# Patient Record
Sex: FEMALE | Race: WHITE | Employment: FULL TIME | URBAN - METROPOLITAN AREA
[De-identification: names, ages, dates, MRNs, and addresses within clinical notes are randomized per-mention and may not be internally consistent; named-entity substitution may affect disease eponyms.]

---

## 2018-09-09 ENCOUNTER — HOSPITAL ENCOUNTER (EMERGENCY)
Age: 73
Discharge: HOME | End: 2018-09-09
Attending: EMERGENCY MEDICINE
Payer: COMMERCIAL

## 2018-09-09 VITALS
RESPIRATION RATE: 14 BRPM | BODY MASS INDEX: 33.96 KG/M2 | SYSTOLIC BLOOD PRESSURE: 181 MMHG | HEIGHT: 69 IN | DIASTOLIC BLOOD PRESSURE: 72 MMHG | TEMPERATURE: 97.9 F | HEART RATE: 80 BPM | WEIGHT: 229.3 LBS | OXYGEN SATURATION: 92 %

## 2018-09-09 DIAGNOSIS — B02.9 HERPES ZOSTER WITHOUT COMPLICATION: Primary | ICD-10-CM

## 2018-09-09 PROCEDURE — 99282 EMERGENCY DEPT VISIT SF MDM: CPT | Performed by: EMERGENCY MEDICINE

## 2018-09-09 RX ORDER — SPIRONOLACTONE 50 MG/1
TABLET, FILM COATED ORAL DAILY
COMMUNITY

## 2018-09-09 RX ORDER — CELECOXIB 200 MG/1
CAPSULE ORAL 2 TIMES DAILY
COMMUNITY
End: 2022-04-01

## 2018-09-09 RX ORDER — METOPROLOL SUCCINATE 25 MG/1
TABLET, EXTENDED RELEASE ORAL DAILY
COMMUNITY

## 2018-09-09 RX ORDER — PHENOL/SODIUM PHENOLATE
20 AEROSOL, SPRAY (ML) MUCOUS MEMBRANE DAILY
COMMUNITY

## 2018-09-09 RX ORDER — HYDROCODONE BITARTRATE AND ACETAMINOPHEN 5; 325 MG/1; MG/1
1 TABLET ORAL
Qty: 12 TAB | Refills: 0 | Status: SHIPPED | OUTPATIENT
Start: 2018-09-09 | End: 2019-04-09

## 2018-09-09 RX ORDER — PREDNISONE 20 MG/1
60 TABLET ORAL DAILY
Qty: 15 TAB | Refills: 0 | Status: SHIPPED | OUTPATIENT
Start: 2018-09-09 | End: 2018-09-14

## 2018-09-09 RX ORDER — VALACYCLOVIR HYDROCHLORIDE 1 G/1
1000 TABLET, FILM COATED ORAL 3 TIMES DAILY
Qty: 21 TAB | Refills: 0 | Status: SHIPPED | OUTPATIENT
Start: 2018-09-09 | End: 2018-09-16

## 2018-09-09 NOTE — DISCHARGE INSTRUCTIONS

## 2018-09-09 NOTE — ED NOTES
I have reviewed discharge instructions with the patient. The patient verbalized understanding. Patient left ED via Discharge Method: ambulatory to Home with daughter. Opportunity for questions and clarification provided. Patient given 3 scripts. To continue your aftercare when you leave the hospital, you may receive an automated call from our care team to check in on how you are doing. This is a free service and part of our promise to provide the best care and service to meet your aftercare needs.  If you have questions, or wish to unsubscribe from this service please call [phone]. Thank you for Choosing our [facility] Emergency Department.

## 2018-09-09 NOTE — ED PROVIDER NOTES
HPI: 
68 F, here with painful rash that started on the right chest wall that started 6 days ago. Initially itchy then rash and painful. Rt posterior chest wall radiating to Rt anterior. No chest pain or shortness of breath. No fever. No facial lesions. ROS Constitutional: No fever, no chills Skin: no rash Eye: No vision changes ENMT: No sore throat Respiratory: No shortness of breath, no cough Cardiovascular: No chest pain, no palpitations Gastrointestinal: No vomiting, no nausea, no diarrhea, no abdominal pain : No dysuria MSK: No back pain, no muscle pain Neuro: No headache, no change in mental status, no numbness, no tingling, no weakness Psych:  
Endocrine:  
All other review of systems positive per history of present illness and the above otherwise negative or noncontributory. Visit Vitals  /72  Pulse 80  Temp 97.9 °F (36.6 °C)  Resp 14  
 Ht 5' 9\" (1.753 m)  Wt 104 kg (229 lb 4.8 oz)  SpO2 92%  BMI 33.86 kg/m2 Past Medical History:  
Diagnosis Date  Hypertension No past surgical history on file. Prior to Admission Medications Prescriptions Last Dose Informant Patient Reported? Taking? Omeprazole delayed release (PRILOSEC D/R) 20 mg tablet   Yes Yes Sig: Take 20 mg by mouth daily. celecoxib (CELEBREX) 200 mg capsule   Yes Yes Sig: Take  by mouth two (2) times a day. metoprolol succinate (TOPROL-XL) 25 mg XL tablet   Yes Yes Sig: Take  by mouth daily. spironolactone (ALDACTONE) 50 mg tablet   Yes Yes Sig: Take  by mouth daily. Facility-Administered Medications: None Adult Exam  
General: alert, no acute distress Head: normocephalic, atraumatic ENT: moist mucous membranes. No conjunctivitis. No oral lesions Neck: supple, non-tender; full range of motion Cardiovascular: regular rate and rhythm, normal peripheral perfusion, no edema Respiratory:  normal respirations; no wheezing, rales or rhonchi Gastrointestinal: soft, non-tender; no rebound or guarding, no peritoneal signs, no distension Back: non-tender, full range of motion Musculoskeletal: normal range of motion, normal strength, no gross deformities Right posterior chest wall wrapping around to the right anterior chest wall including the breasts with area of palpable, small blisterlike lesion with erythema without any obvious discharge or cellulitis. Neurological: alert and oriented x 4, no gross focal deficits; normal speech Psychiatric: cooperative; appropriate mood and affect MDM: appear consistent with herpes zoster. We'll place the patient on steroid, Valtrex, pain medication and hydrocodone. Recommend follow-up with primary care physician for checkup. Return precautions given. Do not suspect systemic involvement since there are no involvement of the left. ED Course No results found. No results found for this or any previous visit (from the past 24 hour(s)). Dragon voice recognition software was used to create this note. Although the note has been reviewed and corrected where necessary, additional errors may have been overlooked and remain in the text.

## 2018-09-09 NOTE — LETTER
[facility] EMERGENCY DEPT 
[address] [place] [id number] 
[phone] Work/School Note Date: 9/9/2018 To Whom It May concern: 
 
Michelle Johnson was seen and treated today in the emergency room by the following provider(s): 
Attending Provider: Liu Dewitt MD.   
 
Michelle Johnson {Return to school/sport/work: 9/12/18 Sincerely, Liu Dewitt MD

## 2019-06-07 ENCOUNTER — HOSPITAL ENCOUNTER (OUTPATIENT)
Dept: SURGERY | Age: 74
Discharge: HOME | End: 2019-06-07

## 2019-06-11 VITALS — HEIGHT: 69 IN | BODY MASS INDEX: 34.96 KG/M2 | WEIGHT: 236 LBS

## 2019-06-11 PROBLEM — E66.01 SEVERE OBESITY (HCC): Status: ACTIVE | Noted: 2019-06-11

## 2019-06-11 NOTE — PERIOP NOTES
Patient verified name and . Order for consent NOT found in EHR - unable to verify procedure at this time. Type 1b surgery, Phone assessment complete. Orders not received. Labs per surgeon: none  Labs per anesthesia protocol: K+       Patient answered medical/surgical history questions at their best of ability. All prior to admission medications documented in [facility]. Patient instructed to take the following medications the day of surgery according to anesthesia guidelines with a small sip of water: metoprolol, prilosec . Hold all vitamins 7 days prior to surgery and NSAIDS 5 days prior to surgery. Prescription meds to hold: - none    Patient instructed on the following:  Arrive at [facility], time of arrival to be called the day before by 1700  NPO after midnight including gum, mints, and ice chips  Responsible adult must drive patient to the hospital, stay during surgery, and patient will need supervision 24 hours after anesthesia  Use antibacterial soap in shower the night before surgery and on the morning of surgery  All piercings must be removed prior to arrival.    Leave all valuables (money and jewelry) at home but bring insurance card and ID on       DOS. Do not wear make-up, nail polish, lotions, cologne, perfumes, powders, or oil on skin. Patient teach back successful and patient demonstrates knowledge of instruction.

## 2019-06-13 ENCOUNTER — ANESTHESIA EVENT (OUTPATIENT)
Dept: SURGERY | Age: 74
End: 2019-06-13
Payer: COMMERCIAL

## 2019-06-13 NOTE — H&P
Subjective:     Jacki Dey, MRN: [id number], is a 69 y.o.  female presents with PMB and thickened endometrium on U/S. gradually worsening course. See office notes on care. Patient Active Problem List    Diagnosis Date Noted    Severe obesity ([address] 06/11/2019     Past Medical History:   Diagnosis Date    Hypertension       Past Surgical History:   Procedure Laterality Date    HX TONSILLECTOMY        [unfilled]  No Known Allergies   Social History     Tobacco Use    Smoking status: Never Smoker    Smokeless tobacco: Never Used   Substance Use Topics    Alcohol use: No      Family History   Problem Relation Age of Onset    Lung Disease Mother     Lung Disease Father         Prenatal Labs: No results found for: RUBELLAEXT, GRBSEXT, HBSAGEXT, HIVEXT, RPREXT, GONNOEXT, CHLAMEXT     Review of Systems  Constitutional: negative  Respiratory: negative  Cardiovascular: negative  Musculoskeletal:negative    Objective:     No data found. No intake or output data in the 24 hours ending 06/13/19 0925  There were no vitals taken for this visit. General appearance: alert, cooperative, no distress, appears stated age  Head: Normocephalic, without obvious abnormality, atraumatic  Back: symmetric, no curvature. ROM normal. No CVA tenderness. Lungs: clear to auscultation bilaterally  Heart: regular rate and rhythm, S1, S2 normal, no murmur, click, rub or gallop  Abdomen: soft, non-tender. Bowel sounds normal. No masses,  no organomegaly  Pelvic: External genitalia normal, Vagina normal without discharge, Urethra without abnormality or discharge, no bladder tenderness, cervix normal in appearance, uterus normal size, shape, and consistency, cervix  Extremities: extremities normal, atraumatic, no cyanosis or edema  Pulses: 2+ and symmetric  Skin: Skin color, texture, turgor normal. No rashes or lesions      Assessment:     Active Problems:    * No active hospital problems. *      PMB for D and C, hysteroscopy. Discussed risks of infection, DVT, damage to bowel/bladder/other internal organs, bleeding/transfusion, scar tissue/adhesions. All questions answered, will proceed.       Plan:     D and C with hysteroscopy under general anesthesia

## 2019-06-14 ENCOUNTER — HOSPITAL ENCOUNTER (OUTPATIENT)
Age: 74
Discharge: HOME | End: 2019-06-14
Attending: OBSTETRICS & GYNECOLOGY | Admitting: OBSTETRICS & GYNECOLOGY
Payer: COMMERCIAL

## 2019-06-14 ENCOUNTER — ANESTHESIA (OUTPATIENT)
Dept: SURGERY | Age: 74
End: 2019-06-14
Payer: COMMERCIAL

## 2019-06-14 VITALS
DIASTOLIC BLOOD PRESSURE: 65 MMHG | SYSTOLIC BLOOD PRESSURE: 148 MMHG | BODY MASS INDEX: 34.67 KG/M2 | RESPIRATION RATE: 16 BRPM | OXYGEN SATURATION: 94 % | HEART RATE: 63 BPM | WEIGHT: 234.06 LBS | HEIGHT: 69 IN | TEMPERATURE: 96.8 F

## 2019-06-14 DIAGNOSIS — N95.0 PMB (POSTMENOPAUSAL BLEEDING): Primary | ICD-10-CM

## 2019-06-14 LAB — POTASSIUM BLD-SCNC: 4.1 MMOL/L (ref 3.5–5.1)

## 2019-06-14 PROCEDURE — 77030018836 HC SOL IRR NACL ICUM -A: Performed by: OBSTETRICS & GYNECOLOGY

## 2019-06-14 PROCEDURE — 77030010509 HC AIRWY LMA MSK TELE -A: Performed by: ANESTHESIOLOGY

## 2019-06-14 PROCEDURE — 77030019940 HC BLNKT HYPOTHRM STRY -B: Performed by: ANESTHESIOLOGY

## 2019-06-14 PROCEDURE — 76210000016 HC OR PH I REC 1 TO 1.5 HR: Performed by: OBSTETRICS & GYNECOLOGY

## 2019-06-14 PROCEDURE — 74011000250 HC RX REV CODE- 250

## 2019-06-14 PROCEDURE — 88305 TISSUE EXAM BY PATHOLOGIST: CPT

## 2019-06-14 PROCEDURE — 74011250636 HC RX REV CODE- 250/636: Performed by: OBSTETRICS & GYNECOLOGY

## 2019-06-14 PROCEDURE — 74011250636 HC RX REV CODE- 250/636

## 2019-06-14 PROCEDURE — 76210000020 HC REC RM PH II FIRST 0.5 HR: Performed by: OBSTETRICS & GYNECOLOGY

## 2019-06-14 PROCEDURE — 76010000138 HC OR TIME 0.5 TO 1 HR: Performed by: OBSTETRICS & GYNECOLOGY

## 2019-06-14 PROCEDURE — 74011250636 HC RX REV CODE- 250/636: Performed by: ANESTHESIOLOGY

## 2019-06-14 PROCEDURE — 84132 ASSAY OF SERUM POTASSIUM: CPT

## 2019-06-14 PROCEDURE — 76060000032 HC ANESTHESIA 0.5 TO 1 HR: Performed by: OBSTETRICS & GYNECOLOGY

## 2019-06-14 RX ORDER — MIDAZOLAM HYDROCHLORIDE 1 MG/ML
2 INJECTION, SOLUTION INTRAMUSCULAR; INTRAVENOUS
Status: COMPLETED | OUTPATIENT
Start: 2019-06-14 | End: 2019-06-14

## 2019-06-14 RX ORDER — CEFAZOLIN SODIUM/WATER 2 G/20 ML
2 SYRINGE (ML) INTRAVENOUS ONCE
Status: COMPLETED | OUTPATIENT
Start: 2019-06-14 | End: 2019-06-14

## 2019-06-14 RX ORDER — PROPOFOL 10 MG/ML
INJECTION, EMULSION INTRAVENOUS AS NEEDED
Status: DISCONTINUED | OUTPATIENT
Start: 2019-06-14 | End: 2019-06-14 | Stop reason: HOSPADM

## 2019-06-14 RX ORDER — OXYCODONE AND ACETAMINOPHEN 5; 325 MG/1; MG/1
1 TABLET ORAL
Qty: 10 TAB | Refills: 0 | Status: SHIPPED | OUTPATIENT
Start: 2019-06-14 | End: 2019-06-17

## 2019-06-14 RX ORDER — LIDOCAINE HYDROCHLORIDE 10 MG/ML
0.1 INJECTION INFILTRATION; PERINEURAL AS NEEDED
Status: DISCONTINUED | OUTPATIENT
Start: 2019-06-14 | End: 2019-06-14 | Stop reason: HOSPADM

## 2019-06-14 RX ORDER — EPHEDRINE SULFATE 50 MG/ML
INJECTION, SOLUTION INTRAVENOUS AS NEEDED
Status: DISCONTINUED | OUTPATIENT
Start: 2019-06-14 | End: 2019-06-14 | Stop reason: HOSPADM

## 2019-06-14 RX ORDER — ONDANSETRON 2 MG/ML
4 INJECTION INTRAMUSCULAR; INTRAVENOUS
Status: DISCONTINUED | OUTPATIENT
Start: 2019-06-14 | End: 2019-06-14 | Stop reason: HOSPADM

## 2019-06-14 RX ORDER — ACETAMINOPHEN 500 MG
1000 TABLET ORAL ONCE
Status: DISCONTINUED | OUTPATIENT
Start: 2019-06-14 | End: 2019-06-14 | Stop reason: HOSPADM

## 2019-06-14 RX ORDER — ACETAMINOPHEN 500 MG
500 TABLET ORAL
COMMUNITY

## 2019-06-14 RX ORDER — SODIUM CHLORIDE 0.9 % (FLUSH) 0.9 %
5-40 SYRINGE (ML) INJECTION AS NEEDED
Status: DISCONTINUED | OUTPATIENT
Start: 2019-06-14 | End: 2019-06-14 | Stop reason: HOSPADM

## 2019-06-14 RX ORDER — DEXAMETHASONE SODIUM PHOSPHATE 4 MG/ML
INJECTION, SOLUTION INTRA-ARTICULAR; INTRALESIONAL; INTRAMUSCULAR; INTRAVENOUS; SOFT TISSUE AS NEEDED
Status: DISCONTINUED | OUTPATIENT
Start: 2019-06-14 | End: 2019-06-14 | Stop reason: HOSPADM

## 2019-06-14 RX ORDER — FENTANYL CITRATE 50 UG/ML
INJECTION, SOLUTION INTRAMUSCULAR; INTRAVENOUS AS NEEDED
Status: DISCONTINUED | OUTPATIENT
Start: 2019-06-14 | End: 2019-06-14 | Stop reason: HOSPADM

## 2019-06-14 RX ORDER — ALBUTEROL SULFATE 0.83 MG/ML
2.5 SOLUTION RESPIRATORY (INHALATION) AS NEEDED
Status: DISCONTINUED | OUTPATIENT
Start: 2019-06-14 | End: 2019-06-14 | Stop reason: HOSPADM

## 2019-06-14 RX ORDER — ONDANSETRON 2 MG/ML
INJECTION INTRAMUSCULAR; INTRAVENOUS AS NEEDED
Status: DISCONTINUED | OUTPATIENT
Start: 2019-06-14 | End: 2019-06-14 | Stop reason: HOSPADM

## 2019-06-14 RX ORDER — LIDOCAINE HYDROCHLORIDE 20 MG/ML
INJECTION, SOLUTION EPIDURAL; INFILTRATION; INTRACAUDAL; PERINEURAL AS NEEDED
Status: DISCONTINUED | OUTPATIENT
Start: 2019-06-14 | End: 2019-06-14 | Stop reason: HOSPADM

## 2019-06-14 RX ORDER — SODIUM CHLORIDE 0.9 % (FLUSH) 0.9 %
5-40 SYRINGE (ML) INJECTION EVERY 8 HOURS
Status: DISCONTINUED | OUTPATIENT
Start: 2019-06-14 | End: 2019-06-14 | Stop reason: HOSPADM

## 2019-06-14 RX ORDER — HYDROMORPHONE HYDROCHLORIDE 2 MG/ML
0.5 INJECTION, SOLUTION INTRAMUSCULAR; INTRAVENOUS; SUBCUTANEOUS
Status: DISCONTINUED | OUTPATIENT
Start: 2019-06-14 | End: 2019-06-14 | Stop reason: HOSPADM

## 2019-06-14 RX ORDER — SODIUM CHLORIDE, SODIUM LACTATE, POTASSIUM CHLORIDE, CALCIUM CHLORIDE 600; 310; 30; 20 MG/100ML; MG/100ML; MG/100ML; MG/100ML
1000 INJECTION, SOLUTION INTRAVENOUS CONTINUOUS
Status: DISCONTINUED | OUTPATIENT
Start: 2019-06-14 | End: 2019-06-14 | Stop reason: HOSPADM

## 2019-06-14 RX ORDER — KETOROLAC TROMETHAMINE 30 MG/ML
INJECTION, SOLUTION INTRAMUSCULAR; INTRAVENOUS AS NEEDED
Status: DISCONTINUED | OUTPATIENT
Start: 2019-06-14 | End: 2019-06-14 | Stop reason: HOSPADM

## 2019-06-14 RX ADMIN — LIDOCAINE HYDROCHLORIDE 0.1 ML: 10 INJECTION, SOLUTION INFILTRATION; PERINEURAL at 06:53

## 2019-06-14 RX ADMIN — LIDOCAINE HYDROCHLORIDE 100 MG: 20 INJECTION, SOLUTION EPIDURAL; INFILTRATION; INTRACAUDAL; PERINEURAL at 07:51

## 2019-06-14 RX ADMIN — KETOROLAC TROMETHAMINE 15 MG: 30 INJECTION, SOLUTION INTRAMUSCULAR; INTRAVENOUS at 08:08

## 2019-06-14 RX ADMIN — FENTANYL CITRATE 50 MCG: 50 INJECTION, SOLUTION INTRAMUSCULAR; INTRAVENOUS at 07:51

## 2019-06-14 RX ADMIN — MIDAZOLAM 2 MG: 1 INJECTION INTRAMUSCULAR; INTRAVENOUS at 07:40

## 2019-06-14 RX ADMIN — DEXAMETHASONE SODIUM PHOSPHATE 5 MG: 4 INJECTION, SOLUTION INTRA-ARTICULAR; INTRALESIONAL; INTRAMUSCULAR; INTRAVENOUS; SOFT TISSUE at 07:56

## 2019-06-14 RX ADMIN — Medication 2 G: at 07:57

## 2019-06-14 RX ADMIN — ONDANSETRON 4 MG: 2 INJECTION INTRAMUSCULAR; INTRAVENOUS at 07:56

## 2019-06-14 RX ADMIN — EPHEDRINE SULFATE 10 MG: 50 INJECTION, SOLUTION INTRAVENOUS at 07:54

## 2019-06-14 RX ADMIN — SODIUM CHLORIDE, SODIUM LACTATE, POTASSIUM CHLORIDE, AND CALCIUM CHLORIDE 1000 ML: 600; 310; 30; 20 INJECTION, SOLUTION INTRAVENOUS at 06:53

## 2019-06-14 RX ADMIN — PROPOFOL 150 MG: 10 INJECTION, EMULSION INTRAVENOUS at 07:51

## 2019-06-14 RX ADMIN — FENTANYL CITRATE 25 MCG: 50 INJECTION, SOLUTION INTRAMUSCULAR; INTRAVENOUS at 08:02

## 2019-06-14 RX ADMIN — FENTANYL CITRATE 25 MCG: 50 INJECTION, SOLUTION INTRAMUSCULAR; INTRAVENOUS at 08:07

## 2019-06-14 NOTE — ANESTHESIA PREPROCEDURE EVALUATION
Relevant Problems   No relevant active problems       Anesthetic History   No history of anesthetic complications            Review of Systems / Medical History  Patient summary reviewed and pertinent labs reviewed    Pulmonary  Within defined limits                 Neuro/Psych   Within defined limits           Cardiovascular    Hypertension              Exercise tolerance: >4 METS     GI/Hepatic/Renal  Within defined limits              Endo/Other        Morbid obesity     Other Findings              Physical Exam    Airway  Mallampati: II  TM Distance: 4 - 6 cm  Neck ROM: normal range of motion   Mouth opening: Normal     Cardiovascular    Rhythm: regular  Rate: normal      Pertinent negatives: No murmur   Dental  No notable dental hx       Pulmonary  Breath sounds clear to auscultation               Abdominal         Other Findings            Anesthetic Plan    ASA: 2  Anesthesia type: general          Induction: Intravenous  Anesthetic plan and risks discussed with: Patient      LMA

## 2019-06-14 NOTE — BRIEF OP NOTE
BRIEF OPERATIVE NOTE    Date of Procedure: 6/14/2019   Preoperative Diagnosis: PMB (postmenopausal bleeding) [N95.0]  Postoperative Diagnosis: * No post-op diagnosis entered *    Procedure(s):  DILATATION AND CURETTAGE HYSTEROSCOPY  Surgeon(s) and Role:     * Chanda Childs MD - Primary         Surgical Assistant: none    Surgical Staff:  Circ-1: Delmy Walters RN  Scrub Tech-1: Deandra Willson  Event Time In Time Out   Incision Start 0803    Incision Close       Anesthesia: General   Estimated Blood Loss: 5cc  Specimens:   ID Type Source Tests Collected by Time Destination   1 : Endometrial curettings Preservative Uterus  Chanda Childs MD 6/14/2019 0808 Pathology      Findings: wnl, no polyps/fibroids   Complications: none  Implants: * No implants in log *

## 2019-06-14 NOTE — ANESTHESIA POSTPROCEDURE EVALUATION
Procedure(s):  DILATATION AND CURETTAGE HYSTEROSCOPY.     general    Anesthesia Post Evaluation      Multimodal analgesia: multimodal analgesia used between 6 hours prior to anesthesia start to PACU discharge  Patient location during evaluation: bedside  Patient participation: complete - patient participated  Level of consciousness: awake and responsive to light touch  Pain management: adequate  Airway patency: patent  Anesthetic complications: no  Cardiovascular status: acceptable, hemodynamically stable, blood pressure returned to baseline and stable  Respiratory status: acceptable, unassisted, spontaneous ventilation and nonlabored ventilation  Hydration status: acceptable  Post anesthesia nausea and vomiting:  controlled      Vitals Value Taken Time   /67 6/14/2019  8:25 AM   Temp 36 °C (96.8 °F) 6/14/2019  8:24 AM   Pulse 83 6/14/2019  8:24 AM   Resp 14 6/14/2019  8:24 AM   SpO2 93 % 6/14/2019  8:24 AM

## 2019-06-15 NOTE — OP NOTES
[address] [address]  OPERATIVE REPORT    Name:  Haroon Bautista  MR#:  [id number]  :  1945  ACCOUNT #:  [de-identified]  DATE OF SERVICE:  2019    PREOPERATIVE DIAGNOSIS:  Postmenopausal bleeding. POSTOPERATIVE DIAGNOSIS:  Postmenopausal bleeding. PROCEDURE PERFORMED:  1. Dilatation and curettage. 2.  Hysteroscopy. SURGEON:  Faiza Hsieh MD    ASSISTANT:  none. ANESTHESIA:  General.    COMPLICATIONS:  None. SPECIMENS REMOVED:  Tissue, endometrial scrapings. IMPLANTS:  None. ESTIMATED BLOOD LOSS:  5 to 10 mL. PROCEDURE:  After informed consent, the patient was taken to the operating room and given general anesthesia. She was prepped and draped in the usual sterile fashion in the dorsal lithotomy position. Examination under anesthesia was carried out. At this time, a weighted speculum was placed in the vagina and tenaculum was used to grasp the anterior lip of the cervix. Cervix was then dilated to admit the hysteroscope. The uterus sounded to 9 cm. Hysteroscope showed that there were no abnormal findings, just more than average endometrial thickness. There were no polyps or fibroids, the cavity itself was normal.  Endometrial scrapings were now obtained in a 360-degree fashion. The tissue was submitted for pathology study. The hysteroscope was reintroduced and there was no perforation or excessive bleeding. At this time, all the instruments were removed, and the patient was awakened and taken to the recovery room in stable condition. Counts were correct x2. She was given pain pills and precautions on discharge.       Tj Silva MD      [id number]  D:  2019 22:56  T:  06/15/2019 6:04  JOB #:  [id number]

## 2022-03-19 PROBLEM — E66.01 SEVERE OBESITY (HCC): Status: ACTIVE | Noted: 2019-06-11

## 2022-03-19 PROBLEM — N95.0 PMB (POSTMENOPAUSAL BLEEDING): Status: ACTIVE | Noted: 2019-06-14

## 2022-04-01 ENCOUNTER — APPOINTMENT (OUTPATIENT)
Dept: CT IMAGING | Age: 77
End: 2022-04-01
Attending: EMERGENCY MEDICINE
Payer: COMMERCIAL

## 2022-04-01 ENCOUNTER — HOSPITAL ENCOUNTER (EMERGENCY)
Age: 77
Discharge: HOME | End: 2022-04-01
Attending: EMERGENCY MEDICINE
Payer: COMMERCIAL

## 2022-04-01 VITALS
DIASTOLIC BLOOD PRESSURE: 76 MMHG | HEART RATE: 95 BPM | HEIGHT: 69 IN | TEMPERATURE: 98.6 F | SYSTOLIC BLOOD PRESSURE: 165 MMHG | WEIGHT: 220 LBS | OXYGEN SATURATION: 95 % | RESPIRATION RATE: 16 BRPM | BODY MASS INDEX: 32.58 KG/M2

## 2022-04-01 DIAGNOSIS — K50.00 TERMINAL ILEITIS WITHOUT COMPLICATION (HCC): Primary | ICD-10-CM

## 2022-04-01 LAB
ALBUMIN SERPL-MCNC: 3.4 G/DL (ref 3.2–4.6)
ALBUMIN/GLOB SERPL: 0.8 {RATIO} (ref 1.2–3.5)
ALP SERPL-CCNC: 93 U/L (ref 50–136)
ALT SERPL-CCNC: 24 U/L (ref 12–65)
ANION GAP SERPL CALC-SCNC: 5 MMOL/L (ref 7–16)
AST SERPL-CCNC: 15 U/L (ref 15–37)
BASOPHILS # BLD: 0.1 K/UL (ref 0–0.2)
BASOPHILS NFR BLD: 1 % (ref 0–2)
BILIRUB SERPL-MCNC: 0.9 MG/DL (ref 0.2–1.1)
BUN SERPL-MCNC: 11 MG/DL (ref 8–23)
CALCIUM SERPL-MCNC: 9.5 MG/DL (ref 8.3–10.4)
CHLORIDE SERPL-SCNC: 104 MMOL/L (ref 98–107)
CO2 SERPL-SCNC: 29 MMOL/L (ref 21–32)
CREAT SERPL-MCNC: 0.91 MG/DL (ref 0.6–1)
DIFFERENTIAL METHOD BLD: ABNORMAL
EOSINOPHIL # BLD: 0.1 K/UL (ref 0–0.8)
EOSINOPHIL NFR BLD: 1 % (ref 0.5–7.8)
ERYTHROCYTE [DISTWIDTH] IN BLOOD BY AUTOMATED COUNT: 11.9 % (ref 11.9–14.6)
GLOBULIN SER CALC-MCNC: 4.2 G/DL (ref 2.3–3.5)
GLUCOSE SERPL-MCNC: 101 MG/DL (ref 65–100)
HCT VFR BLD AUTO: 44.5 % (ref 35.8–46.3)
HGB BLD-MCNC: 14.5 G/DL (ref 11.7–15.4)
IMM GRANULOCYTES # BLD AUTO: 0.1 K/UL (ref 0–0.5)
IMM GRANULOCYTES NFR BLD AUTO: 1 % (ref 0–5)
LIPASE SERPL-CCNC: 58 U/L (ref 73–393)
LYMPHOCYTES # BLD: 1.1 K/UL (ref 0.5–4.6)
LYMPHOCYTES NFR BLD: 10 % (ref 13–44)
MCH RBC QN AUTO: 31.3 PG (ref 26.1–32.9)
MCHC RBC AUTO-ENTMCNC: 32.6 G/DL (ref 31.4–35)
MCV RBC AUTO: 96.1 FL (ref 79.6–97.8)
MONOCYTES # BLD: 0.9 K/UL (ref 0.1–1.3)
MONOCYTES NFR BLD: 8 % (ref 4–12)
NEUTS SEG # BLD: 8.9 K/UL (ref 1.7–8.2)
NEUTS SEG NFR BLD: 81 % (ref 43–78)
NRBC # BLD: 0 K/UL (ref 0–0.2)
PLATELET # BLD AUTO: 192 K/UL (ref 150–450)
PMV BLD AUTO: 8.7 FL (ref 9.4–12.3)
POTASSIUM SERPL-SCNC: 3.9 MMOL/L (ref 3.5–5.1)
PROT SERPL-MCNC: 7.6 G/DL (ref 6.3–8.2)
RBC # BLD AUTO: 4.63 M/UL (ref 4.05–5.2)
SODIUM SERPL-SCNC: 138 MMOL/L (ref 136–145)
WBC # BLD AUTO: 11.1 K/UL (ref 4.3–11.1)

## 2022-04-01 PROCEDURE — 74177 CT ABD & PELVIS W/CONTRAST: CPT

## 2022-04-01 PROCEDURE — 99285 EMERGENCY DEPT VISIT HI MDM: CPT

## 2022-04-01 PROCEDURE — 80053 COMPREHEN METABOLIC PANEL: CPT

## 2022-04-01 PROCEDURE — 74011250636 HC RX REV CODE- 250/636: Performed by: EMERGENCY MEDICINE

## 2022-04-01 PROCEDURE — 81003 URINALYSIS AUTO W/O SCOPE: CPT

## 2022-04-01 PROCEDURE — 83690 ASSAY OF LIPASE: CPT

## 2022-04-01 PROCEDURE — 74011000258 HC RX REV CODE- 258: Performed by: EMERGENCY MEDICINE

## 2022-04-01 PROCEDURE — 96374 THER/PROPH/DIAG INJ IV PUSH: CPT

## 2022-04-01 PROCEDURE — 85025 COMPLETE CBC W/AUTO DIFF WBC: CPT

## 2022-04-01 PROCEDURE — 74011000636 HC RX REV CODE- 636: Performed by: EMERGENCY MEDICINE

## 2022-04-01 RX ORDER — CIPROFLOXACIN 500 MG/1
500 TABLET ORAL 2 TIMES DAILY
Qty: 20 TABLET | Refills: 0 | Status: SHIPPED | OUTPATIENT
Start: 2022-04-01 | End: 2022-04-11

## 2022-04-01 RX ORDER — ONDANSETRON 8 MG/1
8 TABLET, ORALLY DISINTEGRATING ORAL
Qty: 12 TABLET | Refills: 0 | Status: SHIPPED | OUTPATIENT
Start: 2022-04-01

## 2022-04-01 RX ORDER — KETOROLAC TROMETHAMINE 15 MG/ML
15 INJECTION, SOLUTION INTRAMUSCULAR; INTRAVENOUS
Status: COMPLETED | OUTPATIENT
Start: 2022-04-01 | End: 2022-04-01

## 2022-04-01 RX ORDER — SODIUM CHLORIDE 0.9 % (FLUSH) 0.9 %
5-10 SYRINGE (ML) INJECTION EVERY 8 HOURS
Status: DISCONTINUED | OUTPATIENT
Start: 2022-04-01 | End: 2022-04-01 | Stop reason: HOSPADM

## 2022-04-01 RX ORDER — METRONIDAZOLE 500 MG/1
500 TABLET ORAL 3 TIMES DAILY
Qty: 30 TABLET | Refills: 0 | Status: SHIPPED | OUTPATIENT
Start: 2022-04-01 | End: 2022-04-11

## 2022-04-01 RX ORDER — MORPHINE SULFATE 15 MG/1
7.5 TABLET ORAL
Qty: 9 TABLET | Refills: 0 | Status: SHIPPED | OUTPATIENT
Start: 2022-04-01 | End: 2022-04-04

## 2022-04-01 RX ORDER — SODIUM CHLORIDE 0.9 % (FLUSH) 0.9 %
5-10 SYRINGE (ML) INJECTION AS NEEDED
Status: DISCONTINUED | OUTPATIENT
Start: 2022-04-01 | End: 2022-04-01 | Stop reason: HOSPADM

## 2022-04-01 RX ORDER — SODIUM CHLORIDE 0.9 % (FLUSH) 0.9 %
10 SYRINGE (ML) INJECTION
Status: COMPLETED | OUTPATIENT
Start: 2022-04-01 | End: 2022-04-01

## 2022-04-01 RX ADMIN — Medication 10 ML: at 08:22

## 2022-04-01 RX ADMIN — IOPAMIDOL 100 ML: 755 INJECTION, SOLUTION INTRAVENOUS at 08:21

## 2022-04-01 RX ADMIN — SODIUM CHLORIDE 100 ML: 9 INJECTION, SOLUTION INTRAVENOUS at 08:22

## 2022-04-01 RX ADMIN — KETOROLAC TROMETHAMINE 15 MG: 15 INJECTION, SOLUTION INTRAMUSCULAR; INTRAVENOUS at 07:42

## 2022-04-01 NOTE — Clinical Note
[address] [address] EMERGENCY DEPT  [address] [address] [phone]    Work/School Note    Date: 4/1/2022    To Whom It May concern:    Mignon Aj was seen and treated today in the emergency room by the following provider(s):  Attending Provider: Brooklynn Marino MD.      Mignon Aj is excused from work/school on 04/01/22 and 04/02/22. She is medically clear to return to work/school on 4/3/2022.        Sincerely,          Rajwinder Agee RN

## 2022-04-01 NOTE — DISCHARGE INSTRUCTIONS
Continue your Tylenol. It may help to take your Tylenol 650 mg 4 times a day instead of 1300 mg twice a day during this period of abdominal pain. Ibuprofen 400 mg every 6 hours as needed for breakthrough pain. Morphine 1/2 tablet every 4-6 hours if needed for severe breakthrough pain. Clear liquid today and advance as tolerated. Zofran for nausea. Cipro and Flagyl as prescribed until complete starting this morning. Follow-up with [facility] when called with appointment time.   Call them late Monday morning if you have not heard from them regarding this appointment

## 2022-04-01 NOTE — Clinical Note
[address] [address] EMERGENCY DEPT  [address] [address] [phone]    Work/School Note    Date: 4/1/2022    To Whom It May concern:    Tal Gonzalez was seen and treated today in the emergency room by the following provider(s):  Attending Provider: Michelle Mai MD.      Tla Gonzalez is excused from work/school on 04/01/22 and 04/02/22. She is medically clear to return to work/school on 4/3/2022.        Sincerely,          Guy Son MD

## 2022-04-01 NOTE — ED NOTES
I have reviewed discharge instructions with the patient. The patient verbalized understanding. Patient left ED via Discharge Method: ambulatory to Home with self and family at bedside. Opportunity for questions and clarification provided. Patient given 4 scripts. To continue your aftercare when you leave the hospital, you may receive an automated call from our care team to check in on how you are doing. This is a free service and part of our promise to provide the best care and service to meet your aftercare needs.  If you have questions, or wish to unsubscribe from this service please call [phone]. Thank you for Choosing our [address] [place] [address] Emergency Department.

## 2022-04-01 NOTE — ED PROVIDER NOTES
Ricardo Ritchie is a 69 y.o. female seen on 4/1/2022 in the [facility] EMERGENCY DEPT in room ER01/01. No chief complaint on file. HPI: 21-year-old female with history of hypertension and acid reflux presents with right lower quadrant abdominal pain onset yesterday at 1 PM.  Symptoms have progressively worsened and are worse with movement and certain positions. Pain is sharp and without radiation or aggravating or alleviating factors other than those mentioned previously. She denies any associated symptoms of fever, nausea, vomiting, diarrhea, constipation or blood or dark-colored stool. She denies any urinary symptoms. No numbness tingling or weakness in the extremities. The pain started in the right lower quadrant has not moved and does not radiate    Historian: The patient  REVIEW OF SYSTEMS     Review of Systems   Constitutional: Negative for chills and fever. HENT: Negative for congestion. Respiratory: Negative for cough and shortness of breath. Cardiovascular: Negative for chest pain. Gastrointestinal: Positive for abdominal pain. Negative for diarrhea, nausea and vomiting. Endocrine: Negative for polydipsia and polyuria. Genitourinary: Negative for dysuria, frequency and hematuria. Musculoskeletal: Negative for back pain and myalgias. Neurological: Negative for weakness and numbness. All other systems reviewed and are negative.       PAST MEDICAL HISTORY     Past Medical History:   Diagnosis Date    Hypertension      Past Surgical History:   Procedure Laterality Date    HX DILATION AND CURETTAGE      HX GYN      HX TONSILLECTOMY       Social History     Socioeconomic History    Marital status:    Tobacco Use    Smoking status: Never Smoker    Smokeless tobacco: Never Used   Substance and Sexual Activity    Alcohol use: No    Drug use: No    Sexual activity: Not Currently     Birth control/protection: None     Prior to Admission Medications   Prescriptions Last Dose Informant Patient Reported? Taking? Omeprazole delayed release (PRILOSEC D/R) 20 mg tablet   Yes No   Sig: Take 20 mg by mouth daily. acetaminophen (TYLENOL EXTRA STRENGTH) 500 mg tablet   Yes No   Sig: Take 500 mg by mouth every six (6) hours as needed for Pain.   metoprolol succinate (TOPROL-XL) 25 mg XL tablet   Yes No   Sig: Take  by mouth daily. metroNIDAZOLE (FLAGYL) 500 mg tablet Not Taking at Unknown time  No No   Sig: Take 1 Tab by mouth two (2) times a day. Indications: vaginosis caused by bacteria   Patient not taking: Reported on 4/1/2022   spironolactone (ALDACTONE) 50 mg tablet   Yes No   Sig: Take  by mouth daily. Facility-Administered Medications: None     No Known Allergies     PHYSICAL EXAM       Vitals:    04/01/22 0713   BP: (!) 168/84   Pulse: 94   Resp: 20   Temp: 98.6 °F (37 °C)   SpO2: 99%    Vital signs were reviewed. Physical Exam  Vitals and nursing note reviewed. Constitutional:       General: She is not in acute distress. Appearance: She is well-developed. HENT:      Head: Normocephalic. Eyes:      Pupils: Pupils are equal, round, and reactive to light. Cardiovascular:      Rate and Rhythm: Normal rate and regular rhythm. Heart sounds: Normal heart sounds. Pulmonary:      Effort: Pulmonary effort is normal.      Breath sounds: Normal breath sounds. Abdominal:      General: There is no distension. Palpations: Abdomen is soft. There is no mass. Tenderness: There is abdominal tenderness ( Tenderness right lower quadrant over McBurney's point, negative Rovsing sign. No rebound or guard). There is no guarding or rebound. Musculoskeletal:         General: Normal range of motion. Lymphadenopathy:      Cervical: No cervical adenopathy. Skin:     General: Skin is warm and dry. Neurological:      Mental Status: She is alert and oriented to person, place, and time.           MEDICAL DECISION MAKING ED Course:    Orders Placed This Encounter    CT ABD PELV W CONT    CBC with Diff    CMP    Lipase    DIET NPO    POC Urine Dipstick    SALINE LOCK IV ONE TIME Routine    sodium chloride (NS) flush 5-10 mL    sodium chloride (NS) flush 5-10 mL    ketorolac (TORADOL) injection 15 mg     No results found for this or any previous visit (from the past 8 hour(s)). No results found. MDM  Number of Diagnoses or Management Options  Diagnosis management comments: Check labs and urine and anticipate a CT scan for appendicitis. No signs of peritonitis at this time. Does not seem consistent with renal colic given that she is focally tender. I do not appreciate a pulsatile abdominal mass. Pain is not radicular and there is no ripping or tearing pain so doubt aortic dissection. Ovarian pathology in the differential as well. 9:19 AM  CT shows terminal ileitis and a normal appendix. She has no fever or leukocytosis or diarrhea. I will discuss the case with GI for follow-up and recommendations.        Amount and/or Complexity of Data Reviewed  Clinical lab tests: ordered and reviewed (Results for orders placed or performed during the hospital encounter of 04/01/22  -CBC WITH AUTOMATED DIFF:        Result                      Value             Ref Range           WBC                         11.1              4.3 - 11.1 K*       RBC                         4.63              4.05 - 5.2 M*       HGB                         14.5              11.7 - 15.4 *       HCT                         44.5              35.8 - 46.3 %       MCV                         96.1              79.6 - 97.8 *       MCH                         31.3              26.1 - 32.9 *       MCHC                        32.6              31.4 - 35.0 *       RDW                         11.9              11.9 - 14.6 %       PLATELET                    192               150 - 450 K/*       MPV                         8.7 (L)           9.4 - 12.3 FL       ABSOLUTE NRBC               0.00              0.0 - 0.2 K/*       DF                          AUTOMATED                             NEUTROPHILS                 81 (H)            43 - 78 %           LYMPHOCYTES                 10 (L)            13 - 44 %           MONOCYTES                   8                 4.0 - 12.0 %        EOSINOPHILS                 1                 0.5 - 7.8 %         BASOPHILS                   1                 0.0 - 2.0 %         IMMATURE GRANULOCYTES       1                 0.0 - 5.0 %         ABS. NEUTROPHILS            8.9 (H)           1.7 - 8.2 K/*       ABS. LYMPHOCYTES            1.1               0.5 - 4.6 K/*       ABS. MONOCYTES              0.9               0.1 - 1.3 K/*       ABS. EOSINOPHILS            0.1               0.0 - 0.8 K/*       ABS. BASOPHILS              0.1               0.0 - 0.2 K/*       ABS. IMM.  GRANS.            0.1               0.0 - 0.5 K/*  -METABOLIC PANEL, COMPREHENSIVE:        Result                      Value             Ref Range           Sodium                      138               136 - 145 mm*       Potassium                   3.9               3.5 - 5.1 mm*       Chloride                    104               98 - 107 mmo*       CO2                         29                21 - 32 mmol*       Anion gap                   5 (L)             7 - 16 mmol/L       Glucose                     101 (H)           65 - 100 mg/*       BUN                         11                8 - 23 MG/DL        Creatinine                  0.91              0.6 - 1.0 MG*       GFR est AA                  >60               >60 ml/min/1*       GFR est non-AA              >60               >60 ml/min/1*       Calcium                     9.5               8.3 - 10.4 M*       Bilirubin, total            0.9               0.2 - 1.1 MG*       ALT (SGPT)                  24                12 - 65 U/L         AST (SGOT)                  15                15 - 37 U/L Alk. phosphatase            93                50 - 136 U/L        Protein, total              7.6               6.3 - 8.2 g/*       Albumin                     3.4               3.2 - 4.6 g/*       Globulin                    4.2 (H)           2.3 - 3.5 g/*       A-G Ratio                   0.8 (L)           1.2 - 3.5      -LIPASE:        Result                      Value             Ref Range           Lipase                      58 (L)            73 - 393 U/L   )  Tests in the radiology section of [facility]: ordered and reviewed (CT ABD PELV W CONT    Result Date: 4/1/2022  EXAMINATION: CT ABD PELV W CONT 4/1/2022 8:30 AM ACCESSION NUMBER: [id number] COMPARISON: None available INDICATION: RLQ abd pain TECHNIQUE: Contiguous axial computed tomographic images were obtained from the domes of the diaphragm to the symphysis pubis following administration 100mL Iso-norbert 370. Coronal reconstructions were also performed. Radiation dose reduction techniques were used for this study. Our CT scanners use one or all of the following: Automated exposure control, adjustment of the mA and/or kV according to patient size, iterative reconstruction. FINDINGS: LUNG BASES: No airspace consolidation within the lung bases. No sizable pleural effusion. The heart is not enlarged. LIVER: The liver contour is normal. Diffuse hypoattenuation of the liver parenchyma, compatible with hepatic steatosis. BILIARY TREE: The gallbladder is within normal limits. No biliary dilation. SPLEEN: Normal. PANCREAS: No pancreatic mass or ductal dilation. ADRENALS: Normal. KIDNEYS/BLADDER: The kidneys are symmetric in size. No renal calculus or hydronephrosis. Simple appearing left renal cyst measuring up to 6 cm in diameter. The urinary bladder is unremarkable. BOWEL: Numerous colonic diverticula. Focal small bowel wall thickening involving the terminal ileum with associated mesenteric fat stranding and a small amount of fluid.  The small bowel is normal in caliber without evidence of obstruction. No evidence of perforation. Small fat-containing hiatal hernia. The stomach and duodenum are unremarkable. APPENDIX: The appendix is normal in appearance. PERITONEUM/RETROPERITONEUM: No ascites or free air. No pelvic or retroperitoneal lymphadenopathy. VESSELS: No abdominal aortic aneurysm. Minimal calcified atherosclerotic disease. ABDOMINAL WALL: No hernia or mass. REPRODUCTIVE: The uterus and ovaries unremarkable. BONES: No suspicious osseous lesion. Degenerative changes of the lumbar spine. CT findings suggestive of terminal ileitis, as evidenced by ileal wall thickening with mesenteric fat stranding in the right lower quadrant. Normal appendix.     )  Discuss the patient with other providers: yes    Risk of Complications, Morbidity, and/or Mortality  Presenting problems: moderate  Diagnostic procedures: low  Management options: low    Patient Progress  Patient progress: stable    Procedures    Disposition:    Diagnosis: No diagnosis found. ____________________________________________________________________  A portion of this note was generated using voice recognition dictation software. While the note has been reviewed for accuracy, please note certain words and phrases may not be transcribed as intended and some grammatical and/or typographical errors may be present.

## 2022-05-11 ENCOUNTER — HOSPITAL ENCOUNTER (OUTPATIENT)
Dept: LAB | Age: 77
Discharge: HOME | End: 2022-05-11

## 2022-05-11 PROCEDURE — 88305 TISSUE EXAM BY PATHOLOGIST: CPT

## 2024-03-16 ENCOUNTER — TRANSCRIBE ORDERS (OUTPATIENT)
Dept: SCHEDULING | Age: 79
End: 2024-03-16

## 2024-03-16 DIAGNOSIS — Z12.31 VISIT FOR SCREENING MAMMOGRAM: Primary | ICD-10-CM

## 2025-04-24 ENCOUNTER — TRANSCRIBE ORDERS (OUTPATIENT)
Dept: SCHEDULING | Age: 80
End: 2025-04-24

## 2025-04-24 DIAGNOSIS — Z12.31 SCREENING MAMMOGRAM, ENCOUNTER FOR: Primary | ICD-10-CM

## (undated) DEVICE — TRAY PREP DRY W/ PREM GLV 2 APPL 6 SPNG 2 UNDPD 1 OVERWRAP

## (undated) DEVICE — CYSTO: Brand: MEDLINE INDUSTRIES, INC.

## (undated) DEVICE — SOLUTION IRRIG 3000ML 0.9% SOD CHL FLX CONT 0797208] ICU MEDICAL INC]

## (undated) DEVICE — DRAPE,UNDERBUTTOCKS,PCH,STERILE: Brand: MEDLINE

## (undated) DEVICE — AMD ANTIMICROBIAL NON-ADHERENT PAD,0.2% POLYHEXAMETHYLENE BIGUANIDE HCI (PHMB): Brand: TELFA

## (undated) DEVICE — CARDINAL HEALTH FLEXIBLE LIGHT HANDLE COVER: Brand: CARDINAL HEALTH

## (undated) DEVICE — PERI-PAD,MODERATE: Brand: CURITY

## (undated) DEVICE — DRAPE TWL SURG 16X26IN BLU ORB04] ALLCARE INC]

## (undated) DEVICE — GOWN,REINF,POLY,ECL,PP SLV,XL: Brand: MEDLINE